# Patient Record
Sex: FEMALE | Race: WHITE | NOT HISPANIC OR LATINO
[De-identification: names, ages, dates, MRNs, and addresses within clinical notes are randomized per-mention and may not be internally consistent; named-entity substitution may affect disease eponyms.]

---

## 2017-12-05 ENCOUNTER — RECORD ABSTRACTING (OUTPATIENT)
Age: 42
End: 2017-12-05

## 2017-12-05 DIAGNOSIS — Z80.3 FAMILY HISTORY OF MALIGNANT NEOPLASM OF BREAST: ICD-10-CM

## 2017-12-05 DIAGNOSIS — Z87.19 PERSONAL HISTORY OF OTHER DISEASES OF THE DIGESTIVE SYSTEM: ICD-10-CM

## 2017-12-05 DIAGNOSIS — Z82.49 FAMILY HISTORY OF ISCHEMIC HEART DISEASE AND OTHER DISEASES OF THE CIRCULATORY SYSTEM: ICD-10-CM

## 2017-12-05 DIAGNOSIS — Z78.9 OTHER SPECIFIED HEALTH STATUS: ICD-10-CM

## 2017-12-05 DIAGNOSIS — Z80.49 FAMILY HISTORY OF MALIGNANT NEOPLASM OF OTHER GENITAL ORGANS: ICD-10-CM

## 2017-12-05 DIAGNOSIS — Z83.49 FAMILY HISTORY OF OTHER ENDOCRINE, NUTRITIONAL AND METABOLIC DISEASES: ICD-10-CM

## 2017-12-05 PROBLEM — Z00.00 ENCOUNTER FOR PREVENTIVE HEALTH EXAMINATION: Status: ACTIVE | Noted: 2017-12-05

## 2017-12-05 RX ORDER — MULTIVITAMIN
TABLET ORAL
Refills: 0 | Status: ACTIVE | COMMUNITY

## 2017-12-05 RX ORDER — MERCAPTOPURINE 50 MG/1
50 TABLET ORAL DAILY
Refills: 0 | Status: ACTIVE | COMMUNITY

## 2017-12-08 ENCOUNTER — APPOINTMENT (OUTPATIENT)
Dept: HEART AND VASCULAR | Facility: CLINIC | Age: 42
End: 2017-12-08
Payer: COMMERCIAL

## 2017-12-08 VITALS
HEIGHT: 65 IN | BODY MASS INDEX: 40.82 KG/M2 | DIASTOLIC BLOOD PRESSURE: 78 MMHG | RESPIRATION RATE: 20 BRPM | WEIGHT: 245 LBS | HEART RATE: 74 BPM | SYSTOLIC BLOOD PRESSURE: 116 MMHG

## 2017-12-08 DIAGNOSIS — R94.31 ABNORMAL ELECTROCARDIOGRAM [ECG] [EKG]: ICD-10-CM

## 2017-12-08 DIAGNOSIS — Z86.79 PERSONAL HISTORY OF OTHER DISEASES OF THE CIRCULATORY SYSTEM: ICD-10-CM

## 2017-12-08 DIAGNOSIS — Z86.010 PERSONAL HISTORY OF COLONIC POLYPS: ICD-10-CM

## 2017-12-08 DIAGNOSIS — R06.09 OTHER FORMS OF DYSPNEA: ICD-10-CM

## 2017-12-08 PROCEDURE — 99204 OFFICE O/P NEW MOD 45 MIN: CPT

## 2023-01-09 ENCOUNTER — NON-APPOINTMENT (OUTPATIENT)
Age: 48
End: 2023-01-09

## 2023-01-10 ENCOUNTER — NON-APPOINTMENT (OUTPATIENT)
Age: 48
End: 2023-01-10

## 2023-01-10 ENCOUNTER — APPOINTMENT (OUTPATIENT)
Dept: HEART AND VASCULAR | Facility: CLINIC | Age: 48
End: 2023-01-10
Payer: COMMERCIAL

## 2023-01-10 ENCOUNTER — TRANSCRIPTION ENCOUNTER (OUTPATIENT)
Age: 48
End: 2023-01-10

## 2023-01-10 VITALS
RESPIRATION RATE: 16 BRPM | WEIGHT: 216 LBS | HEART RATE: 80 BPM | HEIGHT: 60 IN | BODY MASS INDEX: 42.41 KG/M2 | SYSTOLIC BLOOD PRESSURE: 130 MMHG | OXYGEN SATURATION: 98 % | DIASTOLIC BLOOD PRESSURE: 78 MMHG

## 2023-01-10 DIAGNOSIS — R00.2 PALPITATIONS: ICD-10-CM

## 2023-01-10 PROCEDURE — 93246 EXT ECG>7D<15D RECORDING: CPT

## 2023-01-10 PROCEDURE — 93000 ELECTROCARDIOGRAM COMPLETE: CPT

## 2023-01-10 PROCEDURE — 99214 OFFICE O/P EST MOD 30 MIN: CPT | Mod: 25

## 2023-01-10 NOTE — REASON FOR VISIT
[FreeTextEntry1] : Rohini Bravo is a 46 yo female with history of Ulcerative Colitis who was last seen by Dr. Ian Mandel in 2017. Here for evaluation of palpitations. She has been feeling palpitations x 2 weeks. Occasional chest discomfort in center of chest. No dyspnea on exertion. Walks for exercise without limitations. \par \par EKG today: SR at 66 bpm. \par \par EXSE 10/2017: nl lv sys fxn; nl victoria fxn; 11:00 min Bk; reached 100% of MPHR. no ischemia\par Resting echo: Normal LV size and systolic function. Trivial MR. Trivial TR Trivial RI.

## 2023-01-10 NOTE — DISCUSSION/SUMMARY
[Possible Cardiac Ischemia (Intermd Prob)] : possible cardiac ischemia (intermediate probability) [Echocardiogram] : echocardiogram [Stress Echocardiogram] : stress echocardiogram [Thyroid Function Tests] : thyroid function tests [Holter Monitor] : a Holter monitor [de-identified] : 2 week Holter - placed on patient today [FreeTextEntry3] : after testing completed

## 2023-01-11 LAB
ALBUMIN SERPL ELPH-MCNC: 4.9 G/DL
ALP BLD-CCNC: 54 U/L
ALT SERPL-CCNC: 22 U/L
ANION GAP SERPL CALC-SCNC: 15 MMOL/L
AST SERPL-CCNC: 21 U/L
BASOPHILS # BLD AUTO: 0.08 K/UL
BASOPHILS NFR BLD AUTO: 1 %
BILIRUB SERPL-MCNC: 0.9 MG/DL
BUN SERPL-MCNC: 11 MG/DL
CALCIUM SERPL-MCNC: 10.4 MG/DL
CHLORIDE SERPL-SCNC: 103 MMOL/L
CHOLEST SERPL-MCNC: 191 MG/DL
CO2 SERPL-SCNC: 27 MMOL/L
CREAT SERPL-MCNC: 0.89 MG/DL
EGFR: 81 ML/MIN/1.73M2
EOSINOPHIL # BLD AUTO: 0.01 K/UL
EOSINOPHIL NFR BLD AUTO: 0.1 %
GLUCOSE SERPL-MCNC: 112 MG/DL
HCT VFR BLD CALC: 47.1 %
HDLC SERPL-MCNC: 47 MG/DL
HGB BLD-MCNC: 15 G/DL
IMM GRANULOCYTES NFR BLD AUTO: 0.3 %
LDLC SERPL CALC-MCNC: 123 MG/DL
LYMPHOCYTES # BLD AUTO: 1.49 K/UL
LYMPHOCYTES NFR BLD AUTO: 19 %
MAN DIFF?: NORMAL
MCHC RBC-ENTMCNC: 30 PG
MCHC RBC-ENTMCNC: 31.8 GM/DL
MCV RBC AUTO: 94.2 FL
MONOCYTES # BLD AUTO: 0.31 K/UL
MONOCYTES NFR BLD AUTO: 4 %
NEUTROPHILS # BLD AUTO: 5.93 K/UL
NEUTROPHILS NFR BLD AUTO: 75.6 %
NONHDLC SERPL-MCNC: 145 MG/DL
PLATELET # BLD AUTO: 329 K/UL
POTASSIUM SERPL-SCNC: 4.5 MMOL/L
PROT SERPL-MCNC: 7.3 G/DL
RBC # BLD: 5 M/UL
RBC # FLD: 12.3 %
SODIUM SERPL-SCNC: 145 MMOL/L
TRIGL SERPL-MCNC: 109 MG/DL
TSH SERPL-ACNC: 0.64 UIU/ML
WBC # FLD AUTO: 7.84 K/UL

## 2023-01-18 PROCEDURE — 93248 EXT ECG>7D<15D REV&INTERPJ: CPT

## 2023-01-30 ENCOUNTER — NON-APPOINTMENT (OUTPATIENT)
Age: 48
End: 2023-01-30

## 2023-02-07 ENCOUNTER — APPOINTMENT (OUTPATIENT)
Dept: HEART AND VASCULAR | Facility: CLINIC | Age: 48
End: 2023-02-07
Payer: COMMERCIAL

## 2023-02-07 VITALS
HEIGHT: 60 IN | SYSTOLIC BLOOD PRESSURE: 140 MMHG | HEART RATE: 92 BPM | DIASTOLIC BLOOD PRESSURE: 80 MMHG | WEIGHT: 216 LBS | BODY MASS INDEX: 42.41 KG/M2 | OXYGEN SATURATION: 97 %

## 2023-02-07 PROCEDURE — 93325 DOPPLER ECHO COLOR FLOW MAPG: CPT

## 2023-02-07 PROCEDURE — 93351 STRESS TTE COMPLETE: CPT

## 2023-02-07 PROCEDURE — 93320 DOPPLER ECHO COMPLETE: CPT

## 2023-02-15 ENCOUNTER — NON-APPOINTMENT (OUTPATIENT)
Age: 48
End: 2023-02-15